# Patient Record
Sex: MALE
[De-identification: names, ages, dates, MRNs, and addresses within clinical notes are randomized per-mention and may not be internally consistent; named-entity substitution may affect disease eponyms.]

---

## 2018-09-14 ENCOUNTER — HOSPITAL ENCOUNTER (EMERGENCY)
Dept: HOSPITAL 92 - ERS | Age: 38
Discharge: HOME | End: 2018-09-14
Payer: COMMERCIAL

## 2018-09-14 DIAGNOSIS — M54.2: ICD-10-CM
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
## 2018-09-14 DIAGNOSIS — M54.5: Primary | ICD-10-CM
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
## 2018-09-14 PROCEDURE — 72100 X-RAY EXAM L-S SPINE 2/3 VWS: CPT

## 2018-09-14 PROCEDURE — 72125 CT NECK SPINE W/O DYE: CPT
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
## 2018-09-17 ENCOUNTER — HOSPITAL ENCOUNTER (EMERGENCY)
Dept: HOSPITAL 92 - ERS | Age: 38
Discharge: HOME | End: 2018-09-17
Payer: COMMERCIAL

## 2018-09-17 DIAGNOSIS — M54.12: ICD-10-CM

## 2018-09-17 DIAGNOSIS — M79.601: ICD-10-CM

## 2018-09-17 DIAGNOSIS — Z87.891: ICD-10-CM

## 2018-09-17 DIAGNOSIS — Z79.899: ICD-10-CM
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
## 2018-09-17 DIAGNOSIS — S60.212A: Primary | ICD-10-CM

## 2018-09-17 PROCEDURE — 96372 THER/PROPH/DIAG INJ SC/IM: CPT
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
## 2019-03-26 ENCOUNTER — HOSPITAL ENCOUNTER (EMERGENCY)
Dept: HOSPITAL 92 - ERS | Age: 39
Discharge: HOME | End: 2019-03-26
Payer: COMMERCIAL
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
## 2019-03-26 DIAGNOSIS — Z87.891: ICD-10-CM

## 2019-03-26 DIAGNOSIS — S51.812A: Primary | ICD-10-CM

## 2019-03-26 PROCEDURE — 12004 RPR S/N/AX/GEN/TRK7.6-12.5CM: CPT

## 2019-03-26 PROCEDURE — 90471 IMMUNIZATION ADMIN: CPT

## 2019-03-26 PROCEDURE — 90715 TDAP VACCINE 7 YRS/> IM: CPT
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
## 2020-09-22 ENCOUNTER — HOSPITAL ENCOUNTER (EMERGENCY)
Dept: HOSPITAL 92 - ERS | Age: 40
Discharge: HOME | End: 2020-09-22
Payer: SELF-PAY

## 2020-09-22 DIAGNOSIS — F17.210: ICD-10-CM

## 2020-09-22 DIAGNOSIS — J02.9: Primary | ICD-10-CM

## 2020-09-22 DIAGNOSIS — Z20.828: ICD-10-CM

## 2020-09-22 PROCEDURE — 87081 CULTURE SCREEN ONLY: CPT

## 2020-09-22 PROCEDURE — 87635 SARS-COV-2 COVID-19 AMP PRB: CPT

## 2020-09-22 PROCEDURE — 87430 STREP A AG IA: CPT

## 2020-09-22 PROCEDURE — U0003 INFECTIOUS AGENT DETECTION BY NUCLEIC ACID (DNA OR RNA); SEVERE ACUTE RESPIRATORY SYNDROME CORONAVIRUS 2 (SARS-COV-2) (CORONAVIRUS DISEASE [COVID-19]), AMPLIFIED PROBE TECHNIQUE, MAKING USE OF HIGH THROUGHPUT TECHNOLOGIES AS DESCRIBED BY CMS-2020-01-R: HCPCS

## 2020-09-22 PROCEDURE — 99283 EMERGENCY DEPT VISIT LOW MDM: CPT
